# Patient Record
Sex: FEMALE | Race: WHITE | NOT HISPANIC OR LATINO | Employment: OTHER | URBAN - METROPOLITAN AREA
[De-identification: names, ages, dates, MRNs, and addresses within clinical notes are randomized per-mention and may not be internally consistent; named-entity substitution may affect disease eponyms.]

---

## 2023-11-07 ENCOUNTER — APPOINTMENT (OUTPATIENT)
Dept: ORTHOPEDIC SURGERY | Facility: CLINIC | Age: 73
End: 2023-11-07
Payer: MEDICARE

## 2023-11-14 ENCOUNTER — OFFICE VISIT (OUTPATIENT)
Dept: ORTHOPEDIC SURGERY | Facility: CLINIC | Age: 73
End: 2023-11-14
Payer: MEDICARE

## 2023-11-14 DIAGNOSIS — M19.032 OSTEOARTHRITIS OF BOTH WRISTS, UNSPECIFIED OSTEOARTHRITIS TYPE: Primary | ICD-10-CM

## 2023-11-14 DIAGNOSIS — M19.031 OSTEOARTHRITIS OF BOTH WRISTS, UNSPECIFIED OSTEOARTHRITIS TYPE: Primary | ICD-10-CM

## 2023-11-14 PROCEDURE — 1159F MED LIST DOCD IN RCRD: CPT | Performed by: ORTHOPAEDIC SURGERY

## 2023-11-14 PROCEDURE — 99213 OFFICE O/P EST LOW 20 MIN: CPT | Performed by: ORTHOPAEDIC SURGERY

## 2023-11-14 PROCEDURE — 20606 DRAIN/INJ JOINT/BURSA W/US: CPT | Performed by: ORTHOPAEDIC SURGERY

## 2023-11-14 PROCEDURE — 1036F TOBACCO NON-USER: CPT | Performed by: ORTHOPAEDIC SURGERY

## 2023-11-14 PROCEDURE — 20604 DRAIN/INJ JOINT/BURSA W/US: CPT | Performed by: ORTHOPAEDIC SURGERY

## 2023-11-14 PROCEDURE — 1160F RVW MEDS BY RX/DR IN RCRD: CPT | Performed by: ORTHOPAEDIC SURGERY

## 2023-11-14 RX ORDER — METHYLPREDNISOLONE ACETATE 40 MG/ML
40 INJECTION, SUSPENSION INTRA-ARTICULAR; INTRALESIONAL; INTRAMUSCULAR; SOFT TISSUE ONCE
Status: COMPLETED | OUTPATIENT
Start: 2024-05-28 | End: 2024-05-28

## 2023-11-14 ASSESSMENT — ENCOUNTER SYMPTOMS
TROUBLE SWALLOWING: 0
WHEEZING: 0
EYE DISCHARGE: 0
SHORTNESS OF BREATH: 0
RHINORRHEA: 0
CHILLS: 0
JOINT SWELLING: 1
FEVER: 0
WOUND: 0

## 2023-11-14 NOTE — PROGRESS NOTES
Reason for Appointment  B/l thumb pain       History of Present Illness  Patient is a 73 y.o. female here today for follow-up evaluation of recurrent bilateral thumb pain.  She has pain at the base of both thumbs, worse with pinching and gripping.  Previous injections did give her over 5 months of relief.  She is not interested in any surgery at this point and would like repeat injections.  No other changes in her past medical history, allergies, or medications.    No past medical history on file.    No past surgical history on file.    Medication Documentation Review Audit    **Prior to Admission medications have not yet been reviewed**         Not on File    Review of Systems   Constitutional:  Negative for chills and fever.   HENT:  Negative for rhinorrhea and trouble swallowing.    Eyes:  Negative for discharge.   Respiratory:  Negative for shortness of breath and wheezing.    Cardiovascular:  Negative for chest pain.   Musculoskeletal:  Positive for joint swelling.   Skin:  Negative for rash and wound.   All other systems reviewed and are negative.    Exam   On exam bilateral hands show DJD, she has swelling at the base of both thumbs, tender over bilateral thumb CMC joints.  Painful CMC grind test.  Good motion of the other digits with no triggering.  Good pulses and sensation in the upper extremities.    Assessment   Bilateral wrist osteoarthritis    Plan   We sterilely injected under ultrasound guidance Depo-Medrol and lidocaine into bilateral thumb CMC joints.  Patient understands the small risk of infection and the signs to look for as well as flare reaction.  Hopefully these give her good relief.  She can follow-up with us as needed for repeat injections    Procedures  After discussing the risks and benefits of the procedure we proceeded with the injection. Using ultrasound guidance we obtained images of the thumb CMC joint and subsequently we sterilely injected a mixture of 20 mg of DepoMedrol and .5 cc  of 1% lidocaine into the bilateral CMC joint. Pt tolerated the procedure well without any adverse effects.     Written by Tresa Abernathy saw, evaluated, and treated the patient with the PA

## 2023-12-04 ENCOUNTER — HOSPITAL ENCOUNTER (OUTPATIENT)
Dept: RADIOLOGY | Facility: HOSPITAL | Age: 73
Discharge: HOME | End: 2023-12-04
Payer: MEDICARE

## 2023-12-04 DIAGNOSIS — M25.552 LEFT HIP PAIN: ICD-10-CM

## 2023-12-04 PROCEDURE — 73502 X-RAY EXAM HIP UNI 2-3 VIEWS: CPT | Mod: LT

## 2023-12-04 PROCEDURE — 73502 X-RAY EXAM HIP UNI 2-3 VIEWS: CPT | Mod: LEFT SIDE | Performed by: RADIOLOGY

## 2023-12-07 ENCOUNTER — OFFICE VISIT (OUTPATIENT)
Dept: ORTHOPEDIC SURGERY | Facility: CLINIC | Age: 73
End: 2023-12-07
Payer: MEDICARE

## 2023-12-07 VITALS — HEIGHT: 63 IN | WEIGHT: 135 LBS | BODY MASS INDEX: 23.92 KG/M2

## 2023-12-07 DIAGNOSIS — M87.052 AVASCULAR NECROSIS OF BONE OF LEFT HIP (MULTI): Primary | ICD-10-CM

## 2023-12-07 DIAGNOSIS — M25.552 LEFT HIP PAIN: ICD-10-CM

## 2023-12-07 DIAGNOSIS — M70.62 TROCHANTERIC BURSITIS OF LEFT HIP: ICD-10-CM

## 2023-12-07 PROCEDURE — 20610 DRAIN/INJ JOINT/BURSA W/O US: CPT | Performed by: ORTHOPAEDIC SURGERY

## 2023-12-07 PROCEDURE — 1159F MED LIST DOCD IN RCRD: CPT | Performed by: ORTHOPAEDIC SURGERY

## 2023-12-07 PROCEDURE — 1160F RVW MEDS BY RX/DR IN RCRD: CPT | Performed by: ORTHOPAEDIC SURGERY

## 2023-12-07 PROCEDURE — 99204 OFFICE O/P NEW MOD 45 MIN: CPT | Performed by: ORTHOPAEDIC SURGERY

## 2023-12-07 PROCEDURE — 1036F TOBACCO NON-USER: CPT | Performed by: ORTHOPAEDIC SURGERY

## 2023-12-07 RX ORDER — TRIAMCINOLONE ACETONIDE 40 MG/ML
80 INJECTION, SUSPENSION INTRA-ARTICULAR; INTRAMUSCULAR
Status: COMPLETED | OUTPATIENT
Start: 2023-12-07 | End: 2023-12-07

## 2023-12-07 RX ORDER — RALOXIFENE HYDROCHLORIDE 60 MG/1
60 TABLET, FILM COATED ORAL DAILY
COMMUNITY

## 2023-12-07 RX ADMIN — TRIAMCINOLONE ACETONIDE 80 MG: 40 INJECTION, SUSPENSION INTRA-ARTICULAR; INTRAMUSCULAR at 14:25

## 2023-12-07 ASSESSMENT — PAIN - FUNCTIONAL ASSESSMENT: PAIN_FUNCTIONAL_ASSESSMENT: NO/DENIES PAIN

## 2023-12-07 NOTE — PROGRESS NOTES
PRIMARY CARE PHYSICIAN: Zeynep Mayorga DO  REFERRING PROVIDER: No referring provider defined for this encounter.     CONSULT ORTHOPAEDIC: Hip Evaluation        ASSESSMENT & PLAN    IMPRESSION:  1.  Left hip trochanteric bursitis  2.  Left hip iliotibial band syndrome  3.  Primary osteoarthritis, left hip with possible underlying avascular process    PLAN:  Discussed with the patient findings above.  Reviewed x-rays with her.  Regarding her left hip we will address her bursitis symptoms today with a corticosteroid injection given that she had good improvement in the past with this.  Additionally started on a home exercise program to work on her abductor tendons and IT band.  I did discuss that she has some symptoms of arthritic change in her hip and possibly due to the projection of the x-ray does appear that she has avascular necrosis but would like to confirm this with an MRI.  Will do virtual follow-up once the MRI is complete to discuss definitive treatment plan if necessary.  In the meantime he continue with activities as tolerated while using pain as a guide.  See below for injection details for left hip bursitis.    L Inj/Asp: R greater trochanteric bursa on 12/7/2023 2:25 PM  Indications: pain  Details: 25 G needle, lateral approach  Medications: 80 mg triamcinolone acetonide 40 mg/mL  Outcome: tolerated well, no immediate complications  Procedure, treatment alternatives, risks and benefits explained, specific risks discussed. Consent was given by the patient.       SUBJECTIVE  CHIEF COMPLAINT:   Chief Complaint   Patient presents with    Left Hip - Pain        HPI: Diana Leyva is a 73 y.o. patient. Diana Leyva has had progressive problems with the left hip over the past 9 years. They do not report any trauma. They do not report any constant or progressive numbness or tingling in their legs. Their symptoms are interfering with activities which include getting up from down on the floor. She  reports a burning sensation and her hip giving out on her.  Localizes the pain to the groin when she gets up from seated position.  In addition to this has pain along the lateral aspect of the hip which is most bothersome and also hurts to lay on the side    FUNCTIONAL STATUS: not limited.  AMBULATORY STATUS: Independent community ambulation without devices  PREVIOUS TREATMENTS: Cortisone injection most recent 2019.  with mild improvement.  Injection was done for bursitis of her left hip  HISTORY OF SURGERY ON AFFECTED HIP(S): No. Previous LTKA done 2-3 years ago.   BACK PAIN REPORTED: Yes       REVIEW OF SYSTEMS  Constitutional: See HPI for pain assessment, No significant weight loss, recent trauma  Cardiovascular: No chest pain, shortness of breath  Respiratory: No difficulty breathing, cough  Gastrointestinal: No nausea, vomiting, diarrhea, constipation  Musculoskeletal: Noted in HPI, positive for pain, restricted motion, stiffness  Integumentary: No rashes, easy bruising, redness   Neurological: no numbness or tingling in extremities, no gait disturbances   Psychiatric: No mood changes, memory changes, social issues  Heme/Lymph: no excessive swelling, easy bruising, excessive bleeding  ENT: No hearing changes  Eyes: No vision changes    History reviewed. No pertinent past medical history.     No Known Allergies     History reviewed. No pertinent surgical history.     No family history on file.     Social History     Socioeconomic History    Marital status:      Spouse name: Not on file    Number of children: Not on file    Years of education: Not on file    Highest education level: Not on file   Occupational History    Not on file   Tobacco Use    Smoking status: Never    Smokeless tobacco: Never   Substance and Sexual Activity    Alcohol use: Not on file    Drug use: Not on file    Sexual activity: Not on file   Other Topics Concern    Not on file   Social History Narrative    Not on file     Social  "Determinants of Health     Financial Resource Strain: Not on file   Food Insecurity: Not on file   Transportation Needs: Not on file   Physical Activity: Not on file   Stress: Not on file   Social Connections: Not on file   Intimate Partner Violence: Not on file   Housing Stability: Not on file        CURRENT MEDICATIONS:   Current Outpatient Medications   Medication Sig Dispense Refill    raloxifene (Evista) 60 mg tablet Take 1 tablet (60 mg) by mouth once daily.       Current Facility-Administered Medications   Medication Dose Route Frequency Provider Last Rate Last Admin    methylPREDNISolone acetate (DEPO-Medrol) injection 40 mg  40 mg intramuscular Once Tresa Horowitz PA-C        methylPREDNISolone acetate (DEPO-Medrol) injection 40 mg  40 mg intramuscular Once Tresa Horowitz PA-C            OBJECTIVE    PHYSICAL EXAM      12/7/2023     1:55 PM   Vitals   Height (in) 1.6 m (5' 3\")   Weight (lb) 135   BMI 23.91 kg/m2   BSA (m2) 1.65 m2   Visit Report Report      Body mass index is 23.91 kg/m².    GENERAL: A/Ox3, NAD. Appears healthy, well nourished  PSYCHIATRIC: Mood stable, appropriate memory recall  EYES: EOM intact, no scleral icterus  CARDIAC: regular rate  LUNGS: Breathing non-labored  SKIN: no erythema, rashes, or ecchymoses     MUSCULOSKELETAL:  Laterality: left Hip Exam  - ROM, Extension: full, no flexion contracture  - Strength: Abduction 5/5, Flexion 5/5. Abductor pain against resistance: Mild  - Palpation:  TTP along greater trochanter, posterolateral border  - Log roll/IR exam: Mildly painful, good IR  - Straight leg raise: negative  - EHL/PF/DF motor intact  - Gait: normal  - Special Tests: positive Zaina    NEUROVASCULAR:  - Neurovascular Status: sensation intact to light touch distally  - Capillary refill brisk at extremities, Bilateral dorsalis pedis pulse 2+      IMAGING:  Multiple views of the affected left hip(s) demonstrate: Mild arthritic changes no joint space narrowing, subchondral " sclerosis, subchondral cystic change with possible articular collapse and sclerotic changes due to underlying avascular process.   X-rays were personally reviewed and interpreted by me.  Radiology reports were reviewed by me as well, if readily available at the time.        Juventino Booth DO  Attending Surgeon  Joint Replacement and Adult Reconstructive Surgery  [place], [place]

## 2023-12-21 ENCOUNTER — APPOINTMENT (OUTPATIENT)
Dept: RADIOLOGY | Facility: CLINIC | Age: 73
End: 2023-12-21
Payer: MEDICARE

## 2023-12-22 ENCOUNTER — APPOINTMENT (OUTPATIENT)
Dept: RADIOLOGY | Facility: CLINIC | Age: 73
End: 2023-12-22
Payer: MEDICARE

## 2023-12-26 ENCOUNTER — APPOINTMENT (OUTPATIENT)
Dept: ORTHOPEDIC SURGERY | Facility: CLINIC | Age: 73
End: 2023-12-26
Payer: MEDICARE

## 2024-01-04 ENCOUNTER — ANCILLARY PROCEDURE (OUTPATIENT)
Dept: RADIOLOGY | Facility: CLINIC | Age: 74
End: 2024-01-04
Payer: MEDICARE

## 2024-01-04 DIAGNOSIS — M87.052 AVASCULAR NECROSIS OF BONE OF LEFT HIP (MULTI): ICD-10-CM

## 2024-01-04 PROCEDURE — 73721 MRI JNT OF LWR EXTRE W/O DYE: CPT | Mod: LEFT SIDE | Performed by: RADIOLOGY

## 2024-01-04 PROCEDURE — 73721 MRI JNT OF LWR EXTRE W/O DYE: CPT | Mod: LT

## 2024-01-11 ENCOUNTER — TELEMEDICINE (OUTPATIENT)
Dept: ORTHOPEDIC SURGERY | Facility: CLINIC | Age: 74
End: 2024-01-11
Payer: MEDICARE

## 2024-01-11 DIAGNOSIS — M16.12 PRIMARY OSTEOARTHRITIS OF LEFT HIP: ICD-10-CM

## 2024-01-11 DIAGNOSIS — M70.62 TROCHANTERIC BURSITIS OF LEFT HIP: Primary | ICD-10-CM

## 2024-01-11 PROCEDURE — 99442 PR PHYS/QHP TELEPHONE EVALUATION 11-20 MIN: CPT | Performed by: ORTHOPAEDIC SURGERY

## 2024-01-11 NOTE — PROGRESS NOTES
Follow up after MRI of left hip     Virtual follow-up done utilizing audio only communication to discuss patient's MRI of her left hip.  Overall her symptoms seem to related to underlying arthritis with no signs or symptoms of avascular process.  We did review the detailed findings MRI report and there was evidence of fracture noted however this appears to be a typo as there is no visual evidence and the final impression does not mention any fracture.  Discussed that if she fails to improve with treatment conservatively and occasional ibuprofen the groin pain may respond to corticosteroid injection which should be done by 1 my partners.  Patient meantime would like to continue conservative management and follow her symptoms as described above and will follow-up with as needed for further management if necessary.

## 2024-05-28 ENCOUNTER — OFFICE VISIT (OUTPATIENT)
Dept: ORTHOPEDIC SURGERY | Facility: CLINIC | Age: 74
End: 2024-05-28
Payer: MEDICARE

## 2024-05-28 DIAGNOSIS — M19.032 OSTEOARTHRITIS OF BOTH WRISTS, UNSPECIFIED OSTEOARTHRITIS TYPE: Primary | ICD-10-CM

## 2024-05-28 DIAGNOSIS — M19.031 OSTEOARTHRITIS OF BOTH WRISTS, UNSPECIFIED OSTEOARTHRITIS TYPE: Primary | ICD-10-CM

## 2024-05-28 PROCEDURE — 99213 OFFICE O/P EST LOW 20 MIN: CPT | Performed by: ORTHOPAEDIC SURGERY

## 2024-05-28 PROCEDURE — 1125F AMNT PAIN NOTED PAIN PRSNT: CPT | Performed by: ORTHOPAEDIC SURGERY

## 2024-05-28 PROCEDURE — 2500000004 HC RX 250 GENERAL PHARMACY W/ HCPCS (ALT 636 FOR OP/ED): Performed by: ORTHOPAEDIC SURGERY

## 2024-05-28 PROCEDURE — 96372 THER/PROPH/DIAG INJ SC/IM: CPT | Performed by: ORTHOPAEDIC SURGERY

## 2024-05-28 PROCEDURE — 2500000005 HC RX 250 GENERAL PHARMACY W/O HCPCS: Performed by: ORTHOPAEDIC SURGERY

## 2024-05-28 PROCEDURE — 1160F RVW MEDS BY RX/DR IN RCRD: CPT | Performed by: ORTHOPAEDIC SURGERY

## 2024-05-28 PROCEDURE — 1159F MED LIST DOCD IN RCRD: CPT | Performed by: ORTHOPAEDIC SURGERY

## 2024-05-28 PROCEDURE — 20606 DRAIN/INJ JOINT/BURSA W/US: CPT | Performed by: ORTHOPAEDIC SURGERY

## 2024-05-28 PROCEDURE — 1036F TOBACCO NON-USER: CPT | Performed by: ORTHOPAEDIC SURGERY

## 2024-05-28 RX ADMIN — METHYLPREDNISOLONE ACETATE 30 MG: 40 INJECTION, SUSPENSION INTRA-ARTICULAR; INTRALESIONAL; INTRAMUSCULAR; INTRASYNOVIAL; SOFT TISSUE at 14:14

## 2024-05-28 RX ADMIN — METHYLPREDNISOLONE ACETATE 20 MG: 40 INJECTION, SUSPENSION INTRA-ARTICULAR; INTRALESIONAL; INTRAMUSCULAR; INTRASYNOVIAL; SOFT TISSUE at 14:14

## 2024-05-28 RX ADMIN — LIDOCAINE HYDROCHLORIDE 1 ML: 10 INJECTION, SOLUTION INFILTRATION; PERINEURAL at 14:14

## 2024-05-30 PROCEDURE — 2500000004 HC RX 250 GENERAL PHARMACY W/ HCPCS (ALT 636 FOR OP/ED): Performed by: PHYSICIAN ASSISTANT

## 2024-05-30 PROCEDURE — 2500000004 HC RX 250 GENERAL PHARMACY W/ HCPCS (ALT 636 FOR OP/ED): Performed by: ORTHOPAEDIC SURGERY

## 2024-05-30 PROCEDURE — 2500000005 HC RX 250 GENERAL PHARMACY W/O HCPCS: Performed by: ORTHOPAEDIC SURGERY

## 2024-05-30 RX ORDER — LIDOCAINE HYDROCHLORIDE 10 MG/ML
1 INJECTION INFILTRATION; PERINEURAL
Status: COMPLETED | OUTPATIENT
Start: 2024-05-28 | End: 2024-05-28

## 2024-05-30 RX ORDER — METHYLPREDNISOLONE ACETATE 40 MG/ML
30 INJECTION, SUSPENSION INTRA-ARTICULAR; INTRALESIONAL; INTRAMUSCULAR; SOFT TISSUE
Status: COMPLETED | OUTPATIENT
Start: 2024-05-28 | End: 2024-05-28

## 2024-05-30 ASSESSMENT — ENCOUNTER SYMPTOMS
WHEEZING: 0
JOINT SWELLING: 1
COLOR CHANGE: 0
EYE DISCHARGE: 0
SHORTNESS OF BREATH: 0
CHILLS: 0
FEVER: 0
TROUBLE SWALLOWING: 0

## 2024-05-30 ASSESSMENT — PAIN - FUNCTIONAL ASSESSMENT: PAIN_FUNCTIONAL_ASSESSMENT: 0-10

## 2024-05-30 ASSESSMENT — PAIN SCALES - GENERAL: PAINLEVEL_OUTOF10: 4

## 2024-05-30 NOTE — PROGRESS NOTES
Reason for Appointment  Chief Complaint   Patient presents with    Right Hand - Pain    Left Hand - Pain     History of Present Illness  Patient is a 74 y.o. female here today for follow-up evaluation of recurrent bilateral thumb pain.  She is having classic recurrent CMC arthritis pain is worse with pinching and gripping.  Previous injections have given her almost 6 months of relief.  She is not interested in surgery.  No recent injuries or falls.  No other changes in her past medical history, allergies, or medications.    History reviewed. No pertinent past medical history.    History reviewed. No pertinent surgical history.    Medication Documentation Review Audit       Reviewed by Tresa Horowitz PA-C (Physician Assistant) on 05/30/24 at 0740      Medication Order Taking? Sig Documenting Provider Last Dose Status   methylPREDNISolone acetate (DEPO-Medrol) injection 40 mg [id number]   Tresa Horowitz PA-C  Active   methylPREDNISolone acetate (DEPO-Medrol) injection 40 mg [id number]   Tresa Horowitz PA-C  Active   raloxifene (Evista) 60 mg tablet [id number] No Take 1 tablet (60 mg) by mouth once daily. Historical Provider, MD Taking Active                    No Known Allergies    Review of Systems   Constitutional:  Negative for chills and fever.   HENT:  Negative for nosebleeds and trouble swallowing.    Eyes:  Negative for discharge.   Respiratory:  Negative for shortness of breath and wheezing.    Cardiovascular:  Negative for chest pain.   Musculoskeletal:  Positive for joint swelling.   Skin:  Negative for color change and pallor.   All other systems reviewed and are negative.    Exam   On exam bilateral hands show DJD, she has some swelling over the base of both thumbs, tenderness over bilateral thumb CMC joints and painful thumb motion.  Decent motion of the other digits with no triggering.  Good pulses and sensation in the upper extremities.    Assessment   Bilateral wrist osteoarthritis    Plan    We sterilely injected under ultrasound guidance Depo-Medrol and lidocaine in bilateral thumb CMC joints.  Patient understands the small risk of infection and the signs look for as well as flare action.  Hopefully these give her good relief.  She can follow-up with us as needed for occasional injections.    M Inj/Asp: R radiocarpal (R CMC) on 5/28/2024 2:14 PM  Indications: pain  Details: 25 G needle, ultrasound-guided  Medications: 1 mL lidocaine 10 mg/mL (1 %); 30 mg methylPREDNISolone acetate 40 mg/mL; 20 mg methylPREDNISolone acetate (DEPO-Medrol) injection 40 mg/mL  Outcome: tolerated well, no immediate complications    After discussing the risks and benefits of the procedure we proceeded with the injection. Using ultrasound guidance we obtained images of the thumb CMC joint and subsequently we sterilely injected a mixture of 20 mg of DepoMedrol and .5 cc of 1% lidocaine into the bilateral CMC joint. Pt tolerated the procedure well without any adverse effects.   Procedure, treatment alternatives, risks and benefits explained, specific risks discussed. Consent was given by the patient. Immediately prior to procedure a time out was called to verify the correct patient, procedure, equipment, support staff and site/side marked as required. Patient was prepped and draped in the usual sterile fashion.       M Inj/Asp: L radiocarpal (L CMC) on 5/28/2024 2:14 PM  Indications: pain  Details: 25 G needle, ultrasound-guided  Medications: 1 mL lidocaine 10 mg/mL (1 %); 30 mg methylPREDNISolone acetate 40 mg/mL; 20 mg methylPREDNISolone acetate (DEPO-Medrol) injection 40 mg/mL  Outcome: tolerated well, no immediate complications  Procedure, treatment alternatives, risks and benefits explained, specific risks discussed. Consent was given by the patient. Immediately prior to procedure a time out was called to verify the correct patient, procedure, equipment, support staff and site/side marked as required. Patient was prepped  and draped in the usual sterile fashion.       Written by Tresa Abernathy saw, evaluated, and treated the patient with the PA

## 2024-11-05 ENCOUNTER — APPOINTMENT (OUTPATIENT)
Dept: ORTHOPEDIC SURGERY | Facility: CLINIC | Age: 74
End: 2024-11-05
Payer: MEDICARE

## 2024-11-08 ENCOUNTER — APPOINTMENT (OUTPATIENT)
Dept: ORTHOPEDIC SURGERY | Facility: HOSPITAL | Age: 74
End: 2024-11-08
Payer: MEDICARE

## 2024-11-12 ENCOUNTER — OFFICE VISIT (OUTPATIENT)
Dept: ORTHOPEDIC SURGERY | Facility: CLINIC | Age: 74
End: 2024-11-12
Payer: MEDICARE

## 2024-11-12 ENCOUNTER — APPOINTMENT (OUTPATIENT)
Dept: ORTHOPEDIC SURGERY | Facility: CLINIC | Age: 74
End: 2024-11-12
Payer: MEDICARE

## 2024-11-12 DIAGNOSIS — M19.031 OSTEOARTHRITIS OF BOTH WRISTS, UNSPECIFIED OSTEOARTHRITIS TYPE: Primary | ICD-10-CM

## 2024-11-12 DIAGNOSIS — M19.032 OSTEOARTHRITIS OF BOTH WRISTS, UNSPECIFIED OSTEOARTHRITIS TYPE: Primary | ICD-10-CM

## 2024-11-12 PROCEDURE — 1160F RVW MEDS BY RX/DR IN RCRD: CPT | Performed by: ORTHOPAEDIC SURGERY

## 2024-11-12 PROCEDURE — 1159F MED LIST DOCD IN RCRD: CPT | Performed by: ORTHOPAEDIC SURGERY

## 2024-11-12 PROCEDURE — 2500000004 HC RX 250 GENERAL PHARMACY W/ HCPCS (ALT 636 FOR OP/ED): Performed by: ORTHOPAEDIC SURGERY

## 2024-11-12 PROCEDURE — 99213 OFFICE O/P EST LOW 20 MIN: CPT | Performed by: ORTHOPAEDIC SURGERY

## 2024-11-12 PROCEDURE — 1125F AMNT PAIN NOTED PAIN PRSNT: CPT | Performed by: ORTHOPAEDIC SURGERY

## 2024-11-12 PROCEDURE — 20606 DRAIN/INJ JOINT/BURSA W/US: CPT | Mod: RT | Performed by: ORTHOPAEDIC SURGERY

## 2024-11-12 PROCEDURE — 1036F TOBACCO NON-USER: CPT | Performed by: ORTHOPAEDIC SURGERY

## 2024-11-12 PROCEDURE — 20606 DRAIN/INJ JOINT/BURSA W/US: CPT | Mod: LT | Performed by: ORTHOPAEDIC SURGERY

## 2024-11-12 ASSESSMENT — ENCOUNTER SYMPTOMS
WHEEZING: 0
NUMBNESS: 0
SHORTNESS OF BREATH: 0
CHILLS: 0
BRUISES/BLEEDS EASILY: 0
FATIGUE: 0
ARTHRALGIAS: 1
FEVER: 0

## 2024-11-12 ASSESSMENT — PAIN - FUNCTIONAL ASSESSMENT: PAIN_FUNCTIONAL_ASSESSMENT: 0-10

## 2024-11-12 ASSESSMENT — PAIN SCALES - GENERAL: PAINLEVEL_OUTOF10: 3

## 2024-11-12 NOTE — PROGRESS NOTES
Reason for Appointment  Chief Complaint   Patient presents with    Right Hand - Pain    Left Hand - Pain     History of Present Illness  Patient is a 74 y.o. female here today for follow-up evaluation of bilateral hand pain. We last saw her on 5/28/24 for bilateral thumb pain and we gave her bilateral cmc injections. Today she states the injections gave her excellent relief and she is requesting repeat injection today. She had no bad reactions to the past injections. The pain has returned. She reports that the right thumb is worse. She reports no numbness. She did have a right small finger injury. She continues to wear the braces at night. No recent falls or injuries. No other changes in past medical history, allergies, or medications.      History reviewed. No pertinent past medical history.    History reviewed. No pertinent surgical history.    Medication Documentation Review Audit       Reviewed by Mohini Givens MA (Medical Assistant) on 11/12/24 at 2915      Medication Order Taking? Sig Documenting Provider Last Dose Status   raloxifene (Evista) 60 mg tablet [id number] Yes Take 1 tablet (60 mg) by mouth once daily. Historical Provider, MD Taking Active                    No Known Allergies    Review of Systems   Constitutional:  Negative for chills, fatigue and fever.   Respiratory:  Negative for shortness of breath and wheezing.    Cardiovascular:  Negative for chest pain and leg swelling.   Musculoskeletal:  Positive for arthralgias.   Allergic/Immunologic: Negative for immunocompromised state.   Neurological:  Negative for numbness.   Hematological:  Does not bruise/bleed easily.       Exam   Bilateral cmc tenderness. Positive cmc grind test. MP joint is stable. Scattered moderate DJD findings. Chronic flexion deformity right small finger.     Assessment   Bilateral wrist osteoarthritis     Plan   We sterilely injected under ultrasound guidance Depo-Medrol and lidocaine in bilateral thumb CMC joints.  "Patient understands the small risk of infection and the signs look for as well as flare action. Hopefully these give her good relief. She can follow-up with us as needed for occasional injections. We did discuss surgical intervention in the future.     Patient ID: Diana Leyva \"Porsche\" is a 74 y.o. female.    M Inj/Asp: R radiocarpal on 11/12/2024 2:55 PM  Indications: pain  Details: ultrasound-guided  Medications: 1 mL lidocaine 10 mg/mL (1 %); 30 mg methylPREDNISolone acetate 40 mg/mL  Outcome: tolerated well, no immediate complications    After discussing the risks and benefits of the procedure we proceeded with the injection. Using ultrasound guidance we obtained images of the thumb CMC joint and subsequently we sterilely injected a mixture of 20 mg of DepoMedrol and .5 cc of 1% lidocaine into the right CMC joint. Pt tolerated the procedure well without any adverse effects.    Procedure, treatment alternatives, risks and benefits explained, specific risks discussed. Consent was given by the patient. Immediately prior to procedure a time out was called to verify the correct patient, procedure, equipment, support staff and site/side marked as required. Patient was prepped and draped in the usual sterile fashion.       M Inj/Asp: L radiocarpal on 11/12/2024 2:55 PM  Indications: pain  Details: ultrasound-guided  Medications: 1 mL lidocaine 10 mg/mL (1 %); 30 mg methylPREDNISolone acetate 40 mg/mL  Outcome: tolerated well, no immediate complications    After discussing the risks and benefits of the procedure we proceeded with the injection. Using ultrasound guidance we obtained images of the thumb CMC joint and subsequently we sterilely injected a mixture of 20 mg of DepoMedrol and .5 cc of 1% lidocaine into the left CMC joint. Pt tolerated the procedure well without any adverse effects.    Procedure, treatment alternatives, risks and benefits explained, specific risks discussed. Consent was given by the patient. " Immediately prior to procedure a time out was called to verify the correct patient, procedure, equipment, support staff and site/side marked as required. Patient was prepped and draped in the usual sterile fashion.             I, Zoila Chavis, attest that this documentation has been prepared under the direction and in the presence of Liam Abernathy MD.   By signing below, I, Liam Abernathy MD, personally performed the services described in this documentation. All medical record entries made by the scribe were at my direction and in my presence. I have reviewed the chart and agree that the record reflects my personal performance and is accurate and complete.

## 2024-11-13 RX ORDER — LIDOCAINE HYDROCHLORIDE 10 MG/ML
1 INJECTION, SOLUTION INFILTRATION; PERINEURAL
Status: COMPLETED | OUTPATIENT
Start: 2024-11-12 | End: 2024-11-12

## 2024-11-13 RX ORDER — METHYLPREDNISOLONE ACETATE 40 MG/ML
30 INJECTION, SUSPENSION INTRA-ARTICULAR; INTRALESIONAL; INTRAMUSCULAR; SOFT TISSUE
Status: COMPLETED | OUTPATIENT
Start: 2024-11-12 | End: 2024-11-12

## 2025-03-04 ENCOUNTER — OFFICE VISIT (OUTPATIENT)
Dept: ORTHOPEDIC SURGERY | Facility: CLINIC | Age: 75
End: 2025-03-04
Payer: MEDICARE

## 2025-03-04 DIAGNOSIS — M19.031 OSTEOARTHRITIS OF BOTH WRISTS, UNSPECIFIED OSTEOARTHRITIS TYPE: Primary | ICD-10-CM

## 2025-03-04 DIAGNOSIS — M19.032 OSTEOARTHRITIS OF BOTH WRISTS, UNSPECIFIED OSTEOARTHRITIS TYPE: Primary | ICD-10-CM

## 2025-03-04 PROCEDURE — 20606 DRAIN/INJ JOINT/BURSA W/US: CPT | Mod: LT | Performed by: ORTHOPAEDIC SURGERY

## 2025-03-04 PROCEDURE — 20606 DRAIN/INJ JOINT/BURSA W/US: CPT | Mod: RT | Performed by: ORTHOPAEDIC SURGERY

## 2025-03-04 PROCEDURE — L3924 HFO WITHOUT JOINTS PRE OTS: HCPCS | Performed by: ORTHOPAEDIC SURGERY

## 2025-03-04 PROCEDURE — 2500000004 HC RX 250 GENERAL PHARMACY W/ HCPCS (ALT 636 FOR OP/ED): Performed by: ORTHOPAEDIC SURGERY

## 2025-03-04 PROCEDURE — 99213 OFFICE O/P EST LOW 20 MIN: CPT | Mod: 25 | Performed by: ORTHOPAEDIC SURGERY

## 2025-03-04 RX ADMIN — LIDOCAINE HYDROCHLORIDE 1 ML: 10 INJECTION, SOLUTION INFILTRATION; PERINEURAL at 09:00

## 2025-03-04 RX ADMIN — METHYLPREDNISOLONE ACETATE 30 MG: 40 INJECTION, SUSPENSION INTRA-ARTICULAR; INTRALESIONAL; INTRAMUSCULAR; SOFT TISSUE at 09:00

## 2025-03-04 ASSESSMENT — ENCOUNTER SYMPTOMS
WHEEZING: 0
CHILLS: 0
FEVER: 0
EYE DISCHARGE: 0
SHORTNESS OF BREATH: 0
JOINT SWELLING: 1
TROUBLE SWALLOWING: 0
ARTHRALGIAS: 1

## 2025-03-04 ASSESSMENT — PAIN - FUNCTIONAL ASSESSMENT: PAIN_FUNCTIONAL_ASSESSMENT: 0-10

## 2025-03-04 ASSESSMENT — PAIN SCALES - GENERAL: PAINLEVEL_OUTOF10: 3

## 2025-03-04 NOTE — PROGRESS NOTES
Reason for Appointment  No chief complaint on file.    History of Present Illness  Patient is a 74 y.o. female here today for follow-up evaluation of recurrent bilateral thumb pain.  She had previous CMC injections 3 months ago that did give her good relief.  She has recurrent pain at the base of both thumbs, worse with pinching and gripping, she loves to ave and is having difficulties doing that due to the pain in the base of the thumbs.  She does have bilateral [employer] Cool braces that she wears.  No other changes in her past medical history, allergies, or medications.    No past medical history on file.    No past surgical history on file.    Medication Documentation Review Audit       Reviewed by Liam Abernathy MD (Physician) on 11/13/24 at 0650      Medication Order Taking? Sig Documenting Provider Last Dose Status   raloxifene (Evista) 60 mg tablet [id number] Yes Take 1 tablet (60 mg) by mouth once daily. Historical Provider, MD Taking Active                    No Known Allergies    Review of Systems   Constitutional:  Negative for chills and fever.   HENT:  Negative for postnasal drip, sneezing and trouble swallowing.    Eyes:  Negative for discharge.   Respiratory:  Negative for shortness of breath and wheezing.    Cardiovascular:  Negative for chest pain.   Musculoskeletal:  Positive for arthralgias and joint swelling.   Skin:  Negative for pallor and rash.   All other systems reviewed and are negative.    Exam   On exam bilateral hands show DJD, she has swelling at the base of both thumbs.  Tender over bilateral thumb CMC joints painful CMC grind test.  Decent digital motion otherwise with no triggering.  Good pulses and sensation in the upper extremities.    Assessment   Bilateral wrist osteoarthritis    Plan   We sterilely injected under ultrasound guidance Depo-Medrol lidocaine into bilateral thumb CMC joints.  Patient understands the small risk of infection and the signs to look for as well as  "flare action.  Hopefully these give her good relief.  She is not interested in any further intervention, she can follow-up with us as needed for injections as needed.  Patient ID: Diana Leyva \"Porsche\" is a 74 y.o. female.    M Inj/Asp: R intercarpal on 3/4/2025 9:00 AM  Indications: pain  Details: 25 G needle, ultrasound-guided  Medications: 1 mL lidocaine 10 mg/mL (1 %); 30 mg methylPREDNISolone acetate 40 mg/mL  Outcome: tolerated well, no immediate complications    After discussing the risks and benefits of the procedure we proceeded with the injection. Using ultrasound guidance we obtained and saved images of the thumb CMC joint and subsequently we sterilely injected a mixture of 20 mg of DepoMedrol and .5 cc of 1% lidocaine into the right CMC joint. Pt tolerated the procedure well without any adverse effects.   Procedure, treatment alternatives, risks and benefits explained, specific risks discussed. Consent was given by the patient. Immediately prior to procedure a time out was called to verify the correct patient, procedure, equipment, support staff and site/side marked as required. Patient was prepped and draped in the usual sterile fashion.       M Inj/Asp: L intercarpal on 3/4/2025 9:00 AM  Indications: pain  Details: 25 G needle, ultrasound-guided  Medications: 1 mL lidocaine 10 mg/mL (1 %); 30 mg methylPREDNISolone acetate 40 mg/mL  Outcome: tolerated well, no immediate complications    After discussing the risks and benefits of the procedure we proceeded with the injection. Using ultrasound guidance we obtained and saved images of the thumb CMC joint and subsequently we sterilely injected a mixture of 20 mg of DepoMedrol and .5 cc of 1% lidocaine into the left CMC joint. Pt tolerated the procedure well without any adverse effects.   Procedure, treatment alternatives, risks and benefits explained, specific risks discussed. Consent was given by the patient. Immediately prior to procedure a time out was " called to verify the correct patient, procedure, equipment, support staff and site/side marked as required. Patient was prepped and draped in the usual sterile fashion.         I, Tresa Horowitz PA-C, am acting as a scribe and attest that this documentation has been prepared under the direction and in the presence of Liam Abernathy MD.  By signing below, I, Liam Abernathy MD, personally performed the services described in this documentation. All medical record entries made by the scribe were at my direction and in my presence. I have reviewed the chart and agree that the record reflects my personal performance and is accurate and complete.

## 2025-03-06 RX ORDER — LIDOCAINE HYDROCHLORIDE 10 MG/ML
1 INJECTION, SOLUTION INFILTRATION; PERINEURAL
Status: COMPLETED | OUTPATIENT
Start: 2025-03-04 | End: 2025-03-04

## 2025-03-06 RX ORDER — METHYLPREDNISOLONE ACETATE 40 MG/ML
30 INJECTION, SUSPENSION INTRA-ARTICULAR; INTRALESIONAL; INTRAMUSCULAR; SOFT TISSUE
Status: COMPLETED | OUTPATIENT
Start: 2025-03-04 | End: 2025-03-04

## 2025-04-29 ENCOUNTER — OFFICE VISIT (OUTPATIENT)
Dept: ORTHOPEDIC SURGERY | Facility: CLINIC | Age: 75
End: 2025-04-29
Payer: MEDICARE

## 2025-04-29 DIAGNOSIS — M19.031 OSTEOARTHRITIS OF RIGHT WRIST, UNSPECIFIED OSTEOARTHRITIS TYPE: Primary | ICD-10-CM

## 2025-04-29 DIAGNOSIS — M19.90 INFLAMMATORY ARTHRITIS: ICD-10-CM

## 2025-04-29 PROCEDURE — 1160F RVW MEDS BY RX/DR IN RCRD: CPT | Performed by: ORTHOPAEDIC SURGERY

## 2025-04-29 PROCEDURE — 1036F TOBACCO NON-USER: CPT | Performed by: ORTHOPAEDIC SURGERY

## 2025-04-29 PROCEDURE — 1125F AMNT PAIN NOTED PAIN PRSNT: CPT | Performed by: ORTHOPAEDIC SURGERY

## 2025-04-29 PROCEDURE — 99213 OFFICE O/P EST LOW 20 MIN: CPT | Performed by: ORTHOPAEDIC SURGERY

## 2025-04-29 PROCEDURE — 1159F MED LIST DOCD IN RCRD: CPT | Performed by: ORTHOPAEDIC SURGERY

## 2025-04-30 LAB
ANA SER QL IF: NEGATIVE
CRP SERPL-MCNC: 9.4 MG/L
ERYTHROCYTE [SEDIMENTATION RATE] IN BLOOD BY WESTERGREN METHOD: 9 MM/H
RHEUMATOID FACT SERPL-ACNC: <10 IU/ML
URATE SERPL-MCNC: 3.3 MG/DL (ref 2.5–7)

## 2025-04-30 ASSESSMENT — PATIENT HEALTH QUESTIONNAIRE - PHQ9
1. LITTLE INTEREST OR PLEASURE IN DOING THINGS: NOT AT ALL
2. FEELING DOWN, DEPRESSED OR HOPELESS: NOT AT ALL
SUM OF ALL RESPONSES TO PHQ9 QUESTIONS 1 AND 2: 0

## 2025-04-30 ASSESSMENT — PAIN - FUNCTIONAL ASSESSMENT: PAIN_FUNCTIONAL_ASSESSMENT: 0-10

## 2025-04-30 ASSESSMENT — ENCOUNTER SYMPTOMS
COLOR CHANGE: 0
TROUBLE SWALLOWING: 0
EYE DISCHARGE: 0
FEVER: 0
CHILLS: 0
WHEEZING: 0
JOINT SWELLING: 1
SINUS PRESSURE: 0
SHORTNESS OF BREATH: 0
ARTHRALGIAS: 1

## 2025-04-30 ASSESSMENT — COLUMBIA-SUICIDE SEVERITY RATING SCALE - C-SSRS
6. HAVE YOU EVER DONE ANYTHING, STARTED TO DO ANYTHING, OR PREPARED TO DO ANYTHING TO END YOUR LIFE?: NO
1. IN THE PAST MONTH, HAVE YOU WISHED YOU WERE DEAD OR WISHED YOU COULD GO TO SLEEP AND NOT WAKE UP?: NO
2. HAVE YOU ACTUALLY HAD ANY THOUGHTS OF KILLING YOURSELF?: NO

## 2025-04-30 ASSESSMENT — PAIN SCALES - GENERAL: PAINLEVEL_OUTOF10: 3

## 2025-04-30 NOTE — PROGRESS NOTES
Reason for Appointment  Chief Complaint   Patient presents with    Right Wrist - Pain     History of Present Illness  Patient is a 74 y.o. female here today for follow-up evaluation of increased right wrist pain.  We have seen her in the past for bilateral CMC injections and she does have significant wrist arthritis.  A few days ago she woke up with increased swelling and pain in the right wrist.  No specific injury she can recall.  She was having difficulty sleeping for a few days.  She iced and rested the wrist and thumbs did calm down.  No history of gout that she knows of.  Symptoms have improved, she does have Comfort Cool braces that she wears.  No other changes in her past medical history, allergies, or medications.     Medical History[1]    Surgical History[2]    Medication Documentation Review Audit       Reviewed by Liam Abernathy MD (Physician) on 03/06/25 at 0749      Medication Order Taking? Sig Documenting Provider Last Dose Status   raloxifene (Evista) 60 mg tablet [id number] No Take 1 tablet (60 mg) by mouth once daily. Historical Provider, MD Taking Active                    RX Allergies[3]    Review of Systems   Constitutional:  Negative for chills and fever.   HENT:  Negative for mouth sores, sinus pressure and trouble swallowing.    Eyes:  Negative for discharge.   Respiratory:  Negative for shortness of breath and wheezing.    Cardiovascular:  Negative for chest pain.   Musculoskeletal:  Positive for arthralgias and joint swelling.   Skin:  Negative for color change and pallor.   All other systems reviewed and are negative.    Exam   On exam bilateral hands show DJD, she does have some swelling over the base of both thumbs.  No severe redness today in the right wrist.  Minimal radiocarpal joint tenderness.  Some stiffness in the right wrist greater than the left wrist.  Decent digital motion otherwise no triggering.  Good pulses and sensation in the upper extremities.    Assessment   Encounter  Diagnosis   Name Primary?    Inflammatory arthritis Yes   Right wrist osteoarthritis    Plan   She did have an acute flareup of right wrist pain and swelling.  We will get a rheumatoid panel to evaluate for any systemic cause of inflammation or gout.  Symptoms have calm down and she can resume activity as tolerated.  She can follow-up with us as needed for repeat CMC injections.    I, Tresa Horowitz PA-C, am acting as a scribe and attest that this documentation has been prepared under the direction and in the presence of Liam Abernathy MD.  By signing below, I, Liam Abernathy MD, personally performed the services described in this documentation. All medical record entries made by the scribe were at my direction and in my presence. I have reviewed the chart and agree that the record reflects my personal performance and is accurate and complete.                         [1] No past medical history on file.  [2] No past surgical history on file.  [3] No Known Allergies

## 2025-05-05 ENCOUNTER — TELEPHONE (OUTPATIENT)
Dept: ORTHOPEDIC SURGERY | Facility: HOSPITAL | Age: 75
End: 2025-05-05
Payer: MEDICARE